# Patient Record
Sex: FEMALE | Race: WHITE | NOT HISPANIC OR LATINO | URBAN - METROPOLITAN AREA
[De-identification: names, ages, dates, MRNs, and addresses within clinical notes are randomized per-mention and may not be internally consistent; named-entity substitution may affect disease eponyms.]

---

## 2017-12-01 ENCOUNTER — OFFICE VISIT (OUTPATIENT)
Dept: ORTHOPEDIC SURGERY | Facility: CLINIC | Age: 72
End: 2017-12-01

## 2017-12-01 VITALS — WEIGHT: 181.2 LBS | BODY MASS INDEX: 33.34 KG/M2 | TEMPERATURE: 97.5 F | HEIGHT: 62 IN

## 2017-12-01 DIAGNOSIS — Z96.652 HX OF TOTAL KNEE ARTHROPLASTY, LEFT: Primary | ICD-10-CM

## 2017-12-01 PROCEDURE — 73562 X-RAY EXAM OF KNEE 3: CPT | Performed by: ORTHOPAEDIC SURGERY

## 2017-12-01 PROCEDURE — 99212 OFFICE O/P EST SF 10 MIN: CPT | Performed by: ORTHOPAEDIC SURGERY

## 2017-12-01 NOTE — PROGRESS NOTES
"Karey Tejada  : 1945 MRN: [id number] DATE: 2017      DIAGNOSIS: 4 year follow up left total knee      SUBJECTIVE:  Patient returns today for 4 year follow up of left total knee replacement. Patient reports doing well with no unusual complaints.  She says, \"my knee feels wonderful.\"    OBJECTIVE:   Exam:. The incision is well healed. No sign of infection. Range of motion is measured at 0 to 121 degrees. The calf is soft and nontender with a negative Homans sign. Strength is normal and the patient is ambulating appropriately.  Neurovascular examination is intact and stable distally.    DIAGNOSTIC STUDIES  Xrays: 3 views of the left knee (AP, lateral, and sunrise) were ordered and reviewed for evaluation of recent knee replacement. They demonstrate a well positioned, well aligned knee replacement without complicating factors noted. In comparison with previous films there has been no change.    ASSESSMENT: 4 years status post left knee replacement.    PLAN: 1) Continue with activity ad beena. and follow-up as needed.      Julio Short MD  2017        "

## 2024-11-19 ENCOUNTER — HOSPITAL ENCOUNTER (OUTPATIENT)
Facility: HOSPITAL | Age: 79
Setting detail: OBSERVATION
Discharge: HOME | End: 2024-11-20
Attending: EMERGENCY MEDICINE | Admitting: INTERNAL MEDICINE
Payer: MEDICARE

## 2024-11-19 DIAGNOSIS — N17.9 AKI (ACUTE KIDNEY INJURY): Primary | ICD-10-CM

## 2024-11-19 DIAGNOSIS — R55 NEAR SYNCOPE: ICD-10-CM

## 2024-11-19 PROBLEM — I10 HTN (HYPERTENSION): Status: ACTIVE | Noted: 2024-11-19

## 2024-11-19 PROBLEM — M81.0 OSTEOPOROSIS: Status: ACTIVE | Noted: 2024-11-19

## 2024-11-19 PROBLEM — E55.9 VITAMIN D DEFICIENCY: Status: ACTIVE | Noted: 2024-11-19

## 2024-11-19 PROBLEM — D47.2 MGUS (MONOCLONAL GAMMOPATHY OF UNKNOWN SIGNIFICANCE): Status: ACTIVE | Noted: 2024-11-19

## 2024-11-19 LAB
ALBUMIN SERPL-MCNC: 3.7 G/DL (ref 3.5–5.2)
ALBUMIN/GLOB SERPL: 1.2 G/DL
ALP SERPL-CCNC: 66 U/L (ref 39–117)
ALT SERPL W P-5'-P-CCNC: 16 U/L (ref 1–33)
ANION GAP SERPL CALCULATED.3IONS-SCNC: 14.7 MMOL/L (ref 5–15)
AST SERPL-CCNC: 19 U/L (ref 1–32)
BACTERIA UR QL AUTO: ABNORMAL /HPF
BASOPHILS # BLD AUTO: 0.03 10*3/MM3 (ref 0–0.2)
BASOPHILS NFR BLD AUTO: 0.2 % (ref 0–1.5)
BILIRUB SERPL-MCNC: 0.3 MG/DL (ref 0–1.2)
BILIRUB UR QL STRIP: NEGATIVE
BUN SERPL-MCNC: 92 MG/DL (ref 8–23)
BUN/CREAT SERPL: 34.8 (ref 7–25)
CALCIUM SPEC-SCNC: 9.4 MG/DL (ref 8.6–10.5)
CHLORIDE SERPL-SCNC: 100 MMOL/L (ref 98–107)
CK SERPL-CCNC: 51 U/L (ref 20–180)
CLARITY UR: CLEAR
CO2 SERPL-SCNC: 20.3 MMOL/L (ref 22–29)
COLOR UR: YELLOW
CREAT SERPL-MCNC: 2.64 MG/DL (ref 0.57–1)
CREAT UR-MCNC: 80.4 MG/DL
DEPRECATED RDW RBC AUTO: 41.1 FL (ref 37–54)
EGFRCR SERPLBLD CKD-EPI 2021: 17.9 ML/MIN/1.73
EOSINOPHIL # BLD AUTO: 0.03 10*3/MM3 (ref 0–0.4)
EOSINOPHIL NFR BLD AUTO: 0.2 % (ref 0.3–6.2)
ERYTHROCYTE [DISTWIDTH] IN BLOOD BY AUTOMATED COUNT: 12.6 % (ref 12.3–15.4)
GEN 5 2HR TROPONIN T REFLEX: 25 NG/L
GLOBULIN UR ELPH-MCNC: 3.2 GM/DL
GLUCOSE BLDC GLUCOMTR-MCNC: 130 MG/DL (ref 70–130)
GLUCOSE BLDC GLUCOMTR-MCNC: 96 MG/DL (ref 70–130)
GLUCOSE SERPL-MCNC: 191 MG/DL (ref 65–99)
GLUCOSE UR STRIP-MCNC: NEGATIVE MG/DL
HCT VFR BLD AUTO: 41.7 % (ref 34–46.6)
HGB BLD-MCNC: 13.8 G/DL (ref 12–15.9)
HGB UR QL STRIP.AUTO: NEGATIVE
HYALINE CASTS UR QL AUTO: ABNORMAL /LPF
IMM GRANULOCYTES # BLD AUTO: 0.09 10*3/MM3 (ref 0–0.05)
IMM GRANULOCYTES NFR BLD AUTO: 0.7 % (ref 0–0.5)
KETONES UR QL STRIP: NEGATIVE
LEUKOCYTE ESTERASE UR QL STRIP.AUTO: ABNORMAL
LYMPHOCYTES # BLD AUTO: 3.47 10*3/MM3 (ref 0.7–3.1)
LYMPHOCYTES NFR BLD AUTO: 26.1 % (ref 19.6–45.3)
MCH RBC QN AUTO: 29.8 PG (ref 26.6–33)
MCHC RBC AUTO-ENTMCNC: 33.1 G/DL (ref 31.5–35.7)
MCV RBC AUTO: 90.1 FL (ref 79–97)
MONOCYTES # BLD AUTO: 0.6 10*3/MM3 (ref 0.1–0.9)
MONOCYTES NFR BLD AUTO: 4.5 % (ref 5–12)
NEUTROPHILS NFR BLD AUTO: 68.3 % (ref 42.7–76)
NEUTROPHILS NFR BLD AUTO: 9.08 10*3/MM3 (ref 1.7–7)
NITRITE UR QL STRIP: NEGATIVE
NRBC BLD AUTO-RTO: 0 /100 WBC (ref 0–0.2)
PH UR STRIP.AUTO: <=5 [PH] (ref 5–8)
PLATELET # BLD AUTO: 295 10*3/MM3 (ref 140–450)
PMV BLD AUTO: 10 FL (ref 6–12)
POTASSIUM SERPL-SCNC: 3.7 MMOL/L (ref 3.5–5.2)
PROT SERPL-MCNC: 6.9 G/DL (ref 6–8.5)
PROT UR QL STRIP: NEGATIVE
QT INTERVAL: 355 MS
QTC INTERVAL: 465 MS
RBC # BLD AUTO: 4.63 10*6/MM3 (ref 3.77–5.28)
RBC # UR STRIP: ABNORMAL /HPF
REF LAB TEST METHOD: ABNORMAL
SODIUM SERPL-SCNC: 135 MMOL/L (ref 136–145)
SODIUM UR-SCNC: <20 MMOL/L
SP GR UR STRIP: 1.01 (ref 1–1.03)
SQUAMOUS #/AREA URNS HPF: ABNORMAL /HPF
TROPONIN T DELTA: -8 NG/L
TROPONIN T SERPL HS-MCNC: 33 NG/L
UROBILINOGEN UR QL STRIP: ABNORMAL
WBC # UR STRIP: ABNORMAL /HPF
WBC NRBC COR # BLD AUTO: 13.3 10*3/MM3 (ref 3.4–10.8)

## 2024-11-19 PROCEDURE — 93005 ELECTROCARDIOGRAM TRACING: CPT | Performed by: EMERGENCY MEDICINE

## 2024-11-19 PROCEDURE — 84484 ASSAY OF TROPONIN QUANT: CPT | Performed by: INTERNAL MEDICINE

## 2024-11-19 PROCEDURE — 25810000003 SODIUM CHLORIDE 0.9 % SOLUTION: Performed by: EMERGENCY MEDICINE

## 2024-11-19 PROCEDURE — 81001 URINALYSIS AUTO W/SCOPE: CPT | Performed by: INTERNAL MEDICINE

## 2024-11-19 PROCEDURE — 82570 ASSAY OF URINE CREATININE: CPT | Performed by: INTERNAL MEDICINE

## 2024-11-19 PROCEDURE — 36415 COLL VENOUS BLD VENIPUNCTURE: CPT | Performed by: INTERNAL MEDICINE

## 2024-11-19 PROCEDURE — 80053 COMPREHEN METABOLIC PANEL: CPT | Performed by: EMERGENCY MEDICINE

## 2024-11-19 PROCEDURE — 82550 ASSAY OF CK (CPK): CPT | Performed by: EMERGENCY MEDICINE

## 2024-11-19 PROCEDURE — 82948 REAGENT STRIP/BLOOD GLUCOSE: CPT

## 2024-11-19 PROCEDURE — 25810000003 SODIUM CHLORIDE 0.9 % SOLUTION: Performed by: INTERNAL MEDICINE

## 2024-11-19 PROCEDURE — 84484 ASSAY OF TROPONIN QUANT: CPT | Performed by: EMERGENCY MEDICINE

## 2024-11-19 PROCEDURE — 85025 COMPLETE CBC W/AUTO DIFF WBC: CPT | Performed by: EMERGENCY MEDICINE

## 2024-11-19 PROCEDURE — G0378 HOSPITAL OBSERVATION PER HR: HCPCS

## 2024-11-19 PROCEDURE — 99285 EMERGENCY DEPT VISIT HI MDM: CPT

## 2024-11-19 PROCEDURE — 84300 ASSAY OF URINE SODIUM: CPT | Performed by: INTERNAL MEDICINE

## 2024-11-19 PROCEDURE — 93010 ELECTROCARDIOGRAM REPORT: CPT | Performed by: INTERNAL MEDICINE

## 2024-11-19 RX ORDER — ACETAMINOPHEN 650 MG/1
650 SUPPOSITORY RECTAL EVERY 4 HOURS PRN
Status: DISCONTINUED | OUTPATIENT
Start: 2024-11-19 | End: 2024-11-20 | Stop reason: HOSPADM

## 2024-11-19 RX ORDER — SODIUM CHLORIDE 0.9 % (FLUSH) 0.9 %
10 SYRINGE (ML) INJECTION AS NEEDED
Status: DISCONTINUED | OUTPATIENT
Start: 2024-11-19 | End: 2024-11-20 | Stop reason: HOSPADM

## 2024-11-19 RX ORDER — NITROGLYCERIN 0.4 MG/1
0.4 TABLET SUBLINGUAL
Status: DISCONTINUED | OUTPATIENT
Start: 2024-11-19 | End: 2024-11-20 | Stop reason: HOSPADM

## 2024-11-19 RX ORDER — VALSARTAN 160 MG/1
160 TABLET ORAL
Status: DISCONTINUED | OUTPATIENT
Start: 2024-11-19 | End: 2024-11-20

## 2024-11-19 RX ORDER — CHOLECALCIFEROL (VITAMIN D3) 25 MCG
1000 TABLET ORAL DAILY
Status: DISCONTINUED | OUTPATIENT
Start: 2024-11-19 | End: 2024-11-20 | Stop reason: HOSPADM

## 2024-11-19 RX ORDER — ACETAMINOPHEN 160 MG/5ML
650 SOLUTION ORAL EVERY 4 HOURS PRN
Status: DISCONTINUED | OUTPATIENT
Start: 2024-11-19 | End: 2024-11-20 | Stop reason: HOSPADM

## 2024-11-19 RX ORDER — HYDROCHLOROTHIAZIDE 25 MG/1
25 TABLET ORAL
Status: DISCONTINUED | OUTPATIENT
Start: 2024-11-19 | End: 2024-11-20

## 2024-11-19 RX ORDER — SODIUM CHLORIDE 0.9 % (FLUSH) 0.9 %
10 SYRINGE (ML) INJECTION EVERY 12 HOURS SCHEDULED
Status: DISCONTINUED | OUTPATIENT
Start: 2024-11-19 | End: 2024-11-20 | Stop reason: HOSPADM

## 2024-11-19 RX ORDER — MULTIPLE VITAMINS W/ MINERALS TAB 9MG-400MCG
1 TAB ORAL DAILY
Status: DISCONTINUED | OUTPATIENT
Start: 2024-11-19 | End: 2024-11-20 | Stop reason: HOSPADM

## 2024-11-19 RX ORDER — ATENOLOL 25 MG/1
25 TABLET ORAL DAILY
Status: DISCONTINUED | OUTPATIENT
Start: 2024-11-19 | End: 2024-11-20

## 2024-11-19 RX ORDER — ACETAMINOPHEN 325 MG/1
650 TABLET ORAL EVERY 4 HOURS PRN
Status: DISCONTINUED | OUTPATIENT
Start: 2024-11-19 | End: 2024-11-20 | Stop reason: HOSPADM

## 2024-11-19 RX ORDER — SODIUM CHLORIDE 9 MG/ML
40 INJECTION, SOLUTION INTRAVENOUS AS NEEDED
Status: DISCONTINUED | OUTPATIENT
Start: 2024-11-19 | End: 2024-11-20 | Stop reason: HOSPADM

## 2024-11-19 RX ORDER — SODIUM CHLORIDE 9 MG/ML
125 INJECTION, SOLUTION INTRAVENOUS CONTINUOUS
Status: DISCONTINUED | OUTPATIENT
Start: 2024-11-19 | End: 2024-11-19

## 2024-11-19 RX ORDER — ONDANSETRON 2 MG/ML
4 INJECTION INTRAMUSCULAR; INTRAVENOUS EVERY 6 HOURS PRN
Status: DISCONTINUED | OUTPATIENT
Start: 2024-11-19 | End: 2024-11-20 | Stop reason: HOSPADM

## 2024-11-19 RX ORDER — SODIUM CHLORIDE 9 MG/ML
125 INJECTION, SOLUTION INTRAVENOUS CONTINUOUS
Status: ACTIVE | OUTPATIENT
Start: 2024-11-19 | End: 2024-11-19

## 2024-11-19 RX ADMIN — SODIUM CHLORIDE 1000 ML: 9 INJECTION, SOLUTION INTRAVENOUS at 14:19

## 2024-11-19 RX ADMIN — Medication 1 TABLET: at 17:52

## 2024-11-19 RX ADMIN — Medication 1000 UNITS: at 17:52

## 2024-11-19 RX ADMIN — Medication 10 ML: at 20:53

## 2024-11-19 RX ADMIN — SODIUM CHLORIDE 125 ML/HR: 9 INJECTION, SOLUTION INTRAVENOUS at 16:08

## 2024-11-19 NOTE — CONSULTS
Referring Provider: Dr. Pisano.  Reason for Consultation: Acute kidney injury on CKD 3B.    Subjective     Chief complaint   Chief Complaint   Patient presents with    Syncope       History of present illness: 78-year-old white female followed by Dr. Silvino Sharma in our group for CKD 3B baseline creatinine 1.4-1.6.  History of hypertension, monoclonal gammopathy unknown significance, vitamin D deficiency and osteoporosis.  On 11/ 7 she saw her primary care nurse practitioner for some right wrist pain and neck pain.  She was started on a prednisone Dosepak.  She was seen by her primary care provider on 11/15/2024 for complaints of nausea, vomiting, fatigue.  She  had some diarrhea earlier in the week but it had subsided.  Labs drawn that day demonstrated BUN of 77 and a creatinine 1.99.    Urinalysis performed in the office yesterday showed a cloudy urine with 25 leukocyte esterase.  Today after eating at [employer].  When she got up to leave, she felt very hot and nauseated like she was going to pass out.  Today in the emergency room her BUN and creatinine were 92 and 2.6.  ROS:Her appetite is improved. Urine output normal .  Bowels are now moving normally.  She has lost approximately 10 pounds over the past week.  No fever but felt hot today just when presyncopal.  Blood pressure has been low.  Left neck musculoskeletal pain.  Right wrist pain better after prednisone.  Past Medical History:   Diagnosis Date    Hypertension     Obesity      Past Surgical History:   Procedure Laterality Date    ABSCESS DRAINAGE      port colon    APPENDECTOMY      CHOLECYSTECTOMY      DILATATION AND CURETTAGE      HYSTERECTOMY       Family History   Problem Relation Age of Onset    Cancer Mother         breast cancer    Diabetes Other     Hypertension Other     Heart disease Other     Lung disease Other     Deep vein thrombosis Other      Wound.  Social History     Tobacco Use    Smoking status: Never    Smokeless tobacco:  Never   Substance Use Topics    Alcohol use: No    Drug use: No     (Not in a hospital admission)    Allergies:  Ace inhibitors, Gabapentin, Levaquin [levofloxacin], Metronidazole, and Tetracyclines & related    Review of Systems  14 points review of system was performed and it was negative other than what noted above in the HPI    Objective     Vital Signs  Temp:  [97 °F (36.1 °C)] 97 °F (36.1 °C)  Heart Rate:  [110] 110  Resp:  [18] 18  BP: (139)/(75) 139/75         No intake/output data recorded.  No intake/output data recorded.  No intake or output data in the 24 hours ending 11/19/24 1420    Physical Exam:  General Appearance: alert, oriented x 3, no acute distress, speech somewhat pressured.  Skin: warm and dry  HEENT: pupils round and reactive to light, oral mucosa normal, nonicteric sclera  Neck: supple, no JVD, trachea midline  Lungs: Clear to auscultation bilaterally.  Heart: RRR, normal S1 and S2, no S3, no rub  Abdomen: soft, nontender, normoactive bowel sounds.  : no palpable bladder,  Extremities: no edema.  Neuro: normal speech, somewhat pressured and tangential.    Results Review:  Results for orders placed or performed during the hospital encounter of 11/19/24   ECG 12 Lead Syncope    Collection Time: 11/19/24  1:17 PM   Result Value Ref Range    QT Interval 355 ms    QTC Interval 465 ms   Comprehensive Metabolic Panel    Collection Time: 11/19/24  1:30 PM    Specimen: Blood   Result Value Ref Range    Glucose 191 (H) 65 - 99 mg/dL    BUN 92 (H) 8 - 23 mg/dL    Creatinine 2.64 (H) 0.57 - 1.00 mg/dL    Sodium 135 (L) 136 - 145 mmol/L    Potassium 3.7 3.5 - 5.2 mmol/L    Chloride 100 98 - 107 mmol/L    CO2 20.3 (L) 22.0 - 29.0 mmol/L    Calcium 9.4 8.6 - 10.5 mg/dL    Total Protein 6.9 6.0 - 8.5 g/dL    Albumin 3.7 3.5 - 5.2 g/dL    ALT (SGPT) 16 1 - 33 U/L    AST (SGOT) 19 1 - 32 U/L    Alkaline Phosphatase 66 39 - 117 U/L    Total Bilirubin 0.3 0.0 - 1.2 mg/dL    Globulin 3.2 gm/dL    A/G Ratio  1.2 g/dL    BUN/Creatinine Ratio 34.8 (H) 7.0 - 25.0    Anion Gap 14.7 5.0 - 15.0 mmol/L    eGFR 17.9 (L) >60.0 mL/min/1.73   CBC Auto Differential    Collection Time: 11/19/24  1:30 PM    Specimen: Blood   Result Value Ref Range    WBC 13.30 (H) 3.40 - 10.80 10*3/mm3    RBC 4.63 3.77 - 5.28 10*6/mm3    Hemoglobin 13.8 12.0 - 15.9 g/dL    Hematocrit 41.7 34.0 - 46.6 %    MCV 90.1 79.0 - 97.0 fL    MCH 29.8 26.6 - 33.0 pg    MCHC 33.1 31.5 - 35.7 g/dL    RDW 12.6 12.3 - 15.4 %    RDW-SD 41.1 37.0 - 54.0 fl    MPV 10.0 6.0 - 12.0 fL    Platelets 295 140 - 450 10*3/mm3    Neutrophil % 68.3 42.7 - 76.0 %    Lymphocyte % 26.1 19.6 - 45.3 %    Monocyte % 4.5 (L) 5.0 - 12.0 %    Eosinophil % 0.2 (L) 0.3 - 6.2 %    Basophil % 0.2 0.0 - 1.5 %    Immature Grans % 0.7 (H) 0.0 - 0.5 %    Neutrophils, Absolute 9.08 (H) 1.70 - 7.00 10*3/mm3    Lymphocytes, Absolute 3.47 (H) 0.70 - 3.10 10*3/mm3    Monocytes, Absolute 0.60 0.10 - 0.90 10*3/mm3    Eosinophils, Absolute 0.03 0.00 - 0.40 10*3/mm3    Basophils, Absolute 0.03 0.00 - 0.20 10*3/mm3    Immature Grans, Absolute 0.09 (H) 0.00 - 0.05 10*3/mm3    nRBC 0.0 0.0 - 0.2 /100 WBC   High Sensitivity Troponin T    Collection Time: 11/19/24  1:30 PM    Specimen: Blood   Result Value Ref Range    HS Troponin T 33 (H) <14 ng/L     Imaging Results (Last 72 Hours)       ** No results found for the last 72 hours. **              sodium chloride, 1,000 mL, Intravenous, Once           Assessment & Plan   Acute kidney injury on CKD 3B with baseline creatinine 1.4 to 1.6. Underlying hypertensive nephrosclerosis.  Acute component likely due to   hypotension, hypovolemia, nausea with poor p.o. intake, diarrhea in the setting of impaired renal autoregulation with valsartan and hydrochlorothiazide.  Some contribution to azotemia from recent steroid.  2.  Hypotension and presyncope.  History of hypertension on atenolol, losartan and hydrochlorothiazide.  Stop antihypertensives.  IV fluid bolus.  3.   Monoclonal gammopathy of unknown significance.  4.  Postmenopausal osteoporosis.  On Prolia.  5.  Vitamin D deficiency.  On supplement.  6.  Hyperglycemia likely due to recent steroid use.  Plan:  Agree with IV fluid bolus and subsequent continuous IV fluid.  Hold valsartan/hydrochlorothiazide and atenolol for now.  Recheck chemistries in the morning.  Accu-Cheks in the next 24 hours.  5.  Check urine chemistries.  I discussed the patient's findings and my recommendations with patient and her friend at bedside.    Anne-Marie Hernandez MD  11/19/24  14:20 EST    Please note that portions of this note were completed with a voice recognition program.

## 2024-11-19 NOTE — ED NOTES
..Nursing report ED to floor  Karey Tejada  78 y.o.  female    HPI :  HPI  Stated Reason for Visit: pt reports near syncopal episode after eating, was standing up to pay. pt states she got dizzy and vomited as well. no complaints of dizziness currently  History Obtained From: patient    Chief Complaint  Chief Complaint   Patient presents with    Syncope       Admitting doctor:   Walter Vergara MD    Admitting diagnosis:   The primary encounter diagnosis was VICKEY (acute kidney injury). A diagnosis of Near syncope was also pertinent to this visit.    Code status:   Current Code Status       Date Active Code Status Order ID Comments User Context       Not on file            Allergies:   Ace inhibitors, Gabapentin, Levaquin [levofloxacin], Metronidazole, and Tetracyclines & related    Isolation:   No active isolations    Intake and Output  No intake or output data in the 24 hours ending 11/19/24 1454    Weight:   There were no vitals filed for this visit.    Most recent vitals:   Vitals:    11/19/24 1307 11/19/24 1309 11/19/24 1431   BP:  139/75 91/70   BP Location:  Right arm    Patient Position:  Sitting    Pulse: 110  85   Resp: 18     Temp: 97 °F (36.1 °C)     SpO2: 99%  93%       Active LDAs/IV Access:   Lines, Drains & Airways       Active LDAs       Name Placement date Placement time Site Days    Peripheral IV 11/19/24 1333 Anterior;Proximal;Right Forearm 11/19/24  1333  Forearm  less than 1                    Labs (abnormal labs have a star):   Labs Reviewed   COMPREHENSIVE METABOLIC PANEL - Abnormal; Notable for the following components:       Result Value    Glucose 191 (*)     BUN 92 (*)     Creatinine 2.64 (*)     Sodium 135 (*)     CO2 20.3 (*)     BUN/Creatinine Ratio 34.8 (*)     eGFR 17.9 (*)     All other components within normal limits    Narrative:     GFR Normal >60  Chronic Kidney Disease <60  Kidney Failure <15    The GFR formula is only valid for adults with stable renal function between ages 18 and  70.   CBC WITH AUTO DIFFERENTIAL - Abnormal; Notable for the following components:    WBC 13.30 (*)     Monocyte % 4.5 (*)     Eosinophil % 0.2 (*)     Immature Grans % 0.7 (*)     Neutrophils, Absolute 9.08 (*)     Lymphocytes, Absolute 3.47 (*)     Immature Grans, Absolute 0.09 (*)     All other components within normal limits   TROPONIN - Abnormal; Notable for the following components:    HS Troponin T 33 (*)     All other components within normal limits    Narrative:     High Sensitive Troponin T Reference Range:  <14.0 ng/L- Negative Female for AMI  <22.0 ng/L- Negative Male for AMI  >=14 - Abnormal Female indicating possible myocardial injury.  >=22 - Abnormal Male indicating possible myocardial injury.   Clinicians would have to utilize clinical acumen, EKG, Troponin, and serial changes to determine if it is an Acute Myocardial Infarction or myocardial injury due to an underlying chronic condition.        CK - Normal   HIGH SENSITIVITIY TROPONIN T 2HR   URINALYSIS W/ MICROSCOPIC IF INDICATED (NO CULTURE)   SODIUM, URINE, RANDOM   CREATININE URINE RANDOM (KIDNEY FUNCTION) GFR COMPONENT   CBC AND DIFFERENTIAL    Narrative:     The following orders were created for panel order CBC & Differential.  Procedure                               Abnormality         Status                     ---------                               -----------         ------                     CBC Auto Differential[[id number]]        Abnormal            Final result                 Please view results for these tests on the individual orders.       EKG:   ECG 12 Lead Syncope   Preliminary Result   HEART QPWZ=014  bpm   RR Vabevkbx=105  ms   OR Lvgdsrzo=341  ms   P Horizontal Axis=15  deg   P Front Axis=79  deg   QRSD Jbsshfcu=588  ms   QT Ilqokktb=920  ms   KBdS=883  ms   QRS Axis=-54  deg   T Wave Axis=44  deg   - ABNORMAL ECG -   Sinus tachycardia   RBBB and LAFB   Date and Time of Study:2024-11-19 13:17:53      Telemetry Scan   Final  Result          Meds given in ED:   Medications   sodium chloride 0.9 % flush 10 mL (has no administration in time range)   sodium chloride 0.9 % infusion (has no administration in time range)   sodium chloride 0.9 % bolus 1,000 mL (1,000 mL Intravenous New Bag 11/19/24 2971)       Imaging results:  No radiology results for the last day    Ambulatory status:   - up with stand by assist     Social issues:   Social History     Socioeconomic History    Marital status:    Tobacco Use    Smoking status: Never    Smokeless tobacco: Never   Substance and Sexual Activity    Alcohol use: No    Drug use: No    Sexual activity: Defer       Peripheral Neurovascular  Peripheral Neurovascular (Adult)  Peripheral Neurovascular WDL: WDL, pulse assessment  Pulse Assessment: radial    Neuro Cognitive  Neuro Cognitive (Adult)  Cognitive/Neuro/Behavioral WDL: WDL, arousability, level of consciousness, mood/behavior, orientation, speech, all  Level of Consciousness: Alert  Arousal Level: opens eyes spontaneously  Orientation: oriented x 4, person, place, time, situation  Speech: clear, spontaneous, logical  Mood/Behavior: calm, cooperative, behavior appropriate to situation  Additional Documentation: [place] Coma Scale (Group), Dizziness Assessment (Group)  Motor Response  Motor Response: general motor response  General Motor Response: purposeful motor response  Faisal Coma Scale  Best Eye Response: 4-->(E4) spontaneous  Best Motor Response: 6-->(M6) obeys commands  Best Verbal Response: 5-->(V5) oriented  Faisal Coma Scale Score: 15    Learning  Learning Assessment  Learning Readiness and Ability: no barriers identified  Education Provided  Person Taught: patient  Teaching Method: verbal instruction  Teaching Focus: self-management, diet modification, medication actions and side effects, medical device/equipment use    Respiratory  Respiratory  Airway WDL: WDL  Respiratory WDL  Respiratory WDL: WDL, all  Rhythm/Pattern,  Respiratory: pattern regular, no shortness of breath reported, unlabored, depth regular  Expansion/Accessory Muscles/Retractions: expansion symmetric, no retractions, no use of accessory muscles    Abdominal Pain  Safety Interventions  Safety Precautions/Falls Reduction: family at bedside    Pain Assessments  Pain (Adult)  (0-10) Pain Rating: Rest: 0    NIH Stroke Scale       Ainsley Jiang RN  11/19/24 14:54 EST

## 2024-11-19 NOTE — ED PROVIDER NOTES
EMERGENCY DEPARTMENT ENCOUNTER    Room Number:  10/10  PCP: Loly Juarez MD    HPI:  Chief Complaint: Near syncope  A complete HPI/ROS/PMH/PSH/SH/FH are unobtainable due to: None  Context: Karey Tejada is a 78 y.o. female who presents to the ED c/o acute near syncope.  Patient was at [employer] today when she stood up to go play.  She walked over the counter and then became lightheaded and nearly passed out.  She denies having any headache, vision change, chest pain, shortness of breath, palpitations.  She did not was escorted to the car where she then had an episode of emesis.  She denies have any abdominal pain.        PAST MEDICAL HISTORY  Active Ambulatory Problems     Diagnosis Date Noted    Plantar fasciitis, right 03/25/2016    Heel pain 03/25/2016    Hx of total knee arthroplasty, left 12/01/2017     Resolved Ambulatory Problems     Diagnosis Date Noted    No Resolved Ambulatory Problems     Past Medical History:   Diagnosis Date    Hypertension     Obesity          PAST SURGICAL HISTORY  Past Surgical History:   Procedure Laterality Date    ABSCESS DRAINAGE      port colon    APPENDECTOMY      CHOLECYSTECTOMY      DILATATION AND CURETTAGE      HYSTERECTOMY           FAMILY HISTORY  Family History   Problem Relation Age of Onset    Cancer Mother         breast cancer    Diabetes Other     Hypertension Other     Heart disease Other     Lung disease Other     Deep vein thrombosis Other          SOCIAL HISTORY  Social History     Socioeconomic History    Marital status:    Tobacco Use    Smoking status: Never    Smokeless tobacco: Never   Substance and Sexual Activity    Alcohol use: No    Drug use: No    Sexual activity: Defer         ALLERGIES  Ace inhibitors, Gabapentin, Levaquin [levofloxacin], Metronidazole, and Tetracyclines & related        REVIEW OF SYSTEMS  Review of Systems     Included in HPI  All systems reviewed and negative except for those discussed in HPI.        PHYSICAL EXAM  ED Triage Vitals   Temp Heart Rate Resp BP SpO2   11/19/24 1307 11/19/24 1307 11/19/24 1307 11/19/24 1309 11/19/24 1307   97 °F (36.1 °C) 110 18 139/75 99 %      Temp src Heart Rate Source Patient Position BP Location FiO2 (%)   -- -- 11/19/24 1309 11/19/24 1309 --     Sitting Right arm        Physical Exam      GENERAL: no acute distress  HENT: nares patent  EYES: no scleral icterus  CV: regular rhythm, normal rate  RESPIRATORY: normal effort, clear to auscultation bilaterally  ABDOMEN: soft, nontender  MUSCULOSKELETAL: no deformity  NEURO: alert, moves all extremities, follows commands  PSYCH:  calm, cooperative  SKIN: warm, dry    Vital signs and nursing notes reviewed.          LAB RESULTS  Recent Results (from the past 24 hours)   POCT URINALYSIS DIPSTICK, AUTOMATED    Collection Time: 11/18/24  2:46 PM    Specimen: Urine    Specimen type and source: Urine, Urine specimen (specimen)   Result Value Ref Range    Color, UA Yellow     Appearance, Fluid Cloudy (A) Clear, Slightly Cloudy    Specific Gravity, UA 1.020 1.000 - 1.030    pH, UA 5 5 - 8    Leukocytes, UA 25 Aggie/ul (A) Negative    Nitrite, UA Negative Negative    Total Protein, urine Negative Negative, Trace    Glucose, UA Normal Normal    External Ketones, Urine Negative Negative    Urobilinogen, UA Normal Normal mg/dL    External Bilirubin, Urine Negative Negative    Blood, UA Negative Negative    QC Acceptable Acceptable    Lot Number [id number]     Expiration Date 7,312,025     Comment     ECG 12 Lead Syncope    Collection Time: 11/19/24  1:17 PM   Result Value Ref Range    QT Interval 355 ms    QTC Interval 465 ms   Comprehensive Metabolic Panel    Collection Time: 11/19/24  1:30 PM    Specimen: Blood   Result Value Ref Range    Glucose 191 (H) 65 - 99 mg/dL    BUN 92 (H) 8 - 23 mg/dL    Creatinine 2.64 (H) 0.57 - 1.00 mg/dL    Sodium 135 (L) 136 - 145 mmol/L    Potassium 3.7 3.5 - 5.2 mmol/L    Chloride 100 98 - 107 mmol/L    CO2  20.3 (L) 22.0 - 29.0 mmol/L    Calcium 9.4 8.6 - 10.5 mg/dL    Total Protein 6.9 6.0 - 8.5 g/dL    Albumin 3.7 3.5 - 5.2 g/dL    ALT (SGPT) 16 1 - 33 U/L    AST (SGOT) 19 1 - 32 U/L    Alkaline Phosphatase 66 39 - 117 U/L    Total Bilirubin 0.3 0.0 - 1.2 mg/dL    Globulin 3.2 gm/dL    A/G Ratio 1.2 g/dL    BUN/Creatinine Ratio 34.8 (H) 7.0 - 25.0    Anion Gap 14.7 5.0 - 15.0 mmol/L    eGFR 17.9 (L) >60.0 mL/min/1.73   CBC Auto Differential    Collection Time: 11/19/24  1:30 PM    Specimen: Blood   Result Value Ref Range    WBC 13.30 (H) 3.40 - 10.80 10*3/mm3    RBC 4.63 3.77 - 5.28 10*6/mm3    Hemoglobin 13.8 12.0 - 15.9 g/dL    Hematocrit 41.7 34.0 - 46.6 %    MCV 90.1 79.0 - 97.0 fL    MCH 29.8 26.6 - 33.0 pg    MCHC 33.1 31.5 - 35.7 g/dL    RDW 12.6 12.3 - 15.4 %    RDW-SD 41.1 37.0 - 54.0 fl    MPV 10.0 6.0 - 12.0 fL    Platelets 295 140 - 450 10*3/mm3    Neutrophil % 68.3 42.7 - 76.0 %    Lymphocyte % 26.1 19.6 - 45.3 %    Monocyte % 4.5 (L) 5.0 - 12.0 %    Eosinophil % 0.2 (L) 0.3 - 6.2 %    Basophil % 0.2 0.0 - 1.5 %    Immature Grans % 0.7 (H) 0.0 - 0.5 %    Neutrophils, Absolute 9.08 (H) 1.70 - 7.00 10*3/mm3    Lymphocytes, Absolute 3.47 (H) 0.70 - 3.10 10*3/mm3    Monocytes, Absolute 0.60 0.10 - 0.90 10*3/mm3    Eosinophils, Absolute 0.03 0.00 - 0.40 10*3/mm3    Basophils, Absolute 0.03 0.00 - 0.20 10*3/mm3    Immature Grans, Absolute 0.09 (H) 0.00 - 0.05 10*3/mm3    nRBC 0.0 0.0 - 0.2 /100 WBC   High Sensitivity Troponin T    Collection Time: 11/19/24  1:30 PM    Specimen: Blood   Result Value Ref Range    HS Troponin T 33 (H) <14 ng/L       Ordered the above labs and reviewed the results.        RADIOLOGY  No Radiology Exams Resulted Within Past 24 Hours    Ordered the above noted radiological studies. Reviewed by me in PACS.        MEDICATIONS GIVEN IN ER  Medications   sodium chloride 0.9 % flush 10 mL (has no administration in time range)   sodium chloride 0.9 % bolus 1,000 mL (has no administration  in time range)         ORDERS PLACED DURING THIS VISIT:  Orders Placed This Encounter   Procedures    Comprehensive Metabolic Panel    CBC Auto Differential    High Sensitivity Troponin T    High Sensitivity Troponin T 2Hr    CK    Urinalysis With Microscopic If Indicated (No Culture) - Urine, Clean Catch    Monitor Blood Pressure    Continuous Pulse Oximetry    [facility] (on-call MD unless specified) Details    Nephrology (on -call MD unless specified)    ECG 12 Lead Syncope    Telemetry Scan    Insert Peripheral IV    CBC & Differential         OUTPATIENT MEDICATION MANAGEMENT:  Current Facility-Administered Medications Ordered in Epic   Medication Dose Route Frequency Provider Last Rate Last Admin    sodium chloride 0.9 % bolus 1,000 mL  1,000 mL Intravenous Once Alexi Pisano II, MD        sodium chloride 0.9 % flush 10 mL  10 mL Intravenous PRN Alexi Pisano II, MD         Current Outpatient Medications Ordered in Epic   Medication Sig Dispense Refill    atenolol (TENORMIN) 25 MG tablet Take 25 mg by mouth daily.      BIOTIN PO Take  by mouth.      Chlorpheniramine Maleate (CHLORPHEN MALEATE PO) Take 4 mg by mouth daily.      cholecalciferol (VITAMIN D3) 1000 UNITS tablet Take 1,000 Units by mouth daily.      Multiple Vitamins-Minerals (WOMENS 50+ MULTI VITAMIN/MIN PO) Take  by mouth daily.      valsartan-hydrochlorothiazide (DIOVAN-HCT) 160-25 MG per tablet Take 1 tablet by mouth daily.         PROCEDURES  Procedures          MEDICAL DECISION MAKING, PROGRESS, and CONSULTS    Discussion below represents my analysis of pertinent findings related to patient's condition, differential diagnosis, treatment plan and final disposition.      Additional sources:  - Discussed/obtained information from independent historians: Friend at bedside  Additional information was obtained to confirm the patient's history.    - External (non-ED) record review: On medical chart review, reviewed outside records from [facility].  She had a CMP performed on 11/15/2024 which was 4 days ago which showed creatinine of 1.99.  Today's creatinine is 2.6.      Differential diagnosis:    Cardiac arrhythmia, orthostatic near syncope, volume depletion.  I have low suspicion for ACS or PE.                 Independent interpretation of labs, radiology studies, and discussions with consultants:  ED Course as of 11/23/24 1706   e Nov 19, 2024   1409 EKG independently interpreted by myself.  Time 1:17 PM.  Sinus rhythm.  Heart rate 103.  Right bundle branch block and LAFB.  No acute ST abnormality. [TD]   1433 I discussed the case with Dr. Hernandez, nephrology.  She will follow along in consultation. [TD]   1436 I discussed the case with Dr. Vergara, hospitalist.  He will admit. [TD]      ED Course User Index  [TD] Alexi Pisano II, MD           DIAGNOSIS  Final diagnoses:   VICKEY (acute kidney injury)   Near syncope         DISPOSITION  Admit      Latest Documented Vital Signs:  As of 14:12 EST  BP- 139/75 HR- 110 Temp- 97 °F (36.1 °C) O2 sat- 99%      --    Please note that portions of this were completed with a voice recognition program.       Note Disclaimer: At [facility], we believe that sharing information builds trust and better relationships. You are receiving this note because you are receiving care at [facility] or recently visited. It is possible you will see health information before a provider has talked with you about it. This kind of information can be easy to misunderstand. To help you fully understand what it means for your health, we urge you to discuss this note with your provider.         Alexi Pisano II, MD  11/23/24 1706

## 2024-11-19 NOTE — PLAN OF CARE
Goal Outcome Evaluation:  Plan of Care Reviewed With: patient           Outcome Evaluation: PT ARRIVED TO UNIT AROUND 1530, RA, ST, ALERT AND ORIENTED X4, STAND BY ASSIST, WILL CONT TO MONITOR                             Problem: Adult Inpatient Plan of Care  Goal: Plan of Care Review  Outcome: Progressing  Flowsheets (Taken 11/19/2024 1545)  Outcome Evaluation: PT ARRIVED TO UNIT AROUND 1530, RA, ST, ALERT AND ORIENTED X4, STAND BY ASSIST, WILL CONT TO MONITOR  Plan of Care Reviewed With: patient  Goal: Patient-Specific Goal (Individualized)  Outcome: Progressing  Goal: Absence of Hospital-Acquired Illness or Injury  Outcome: Progressing  Goal: Optimal Comfort and Wellbeing  Outcome: Progressing  Goal: Readiness for Transition of Care  Outcome: Progressing  Intervention: Mutually Develop Transition Plan  Recent Flowsheet Documentation  Taken 11/19/2024 1535 by Pia Beck, RN  Transportation Anticipated: family or friend will provide  Patient/Family Anticipated Services at Transition: none  Patient/Family Anticipates Transition to: home  Taken 11/19/2024 1533 by Pia Beck, RN  Equipment Currently Used at Home: none

## 2024-11-19 NOTE — H&P
Internal medicine history and physical  INTERNAL MEDICINE   [facility]       Patient Identification:  Name: Karey Tejada  Age: 78 y.o.  Sex: female  :  1945  MRN: [id number]                   Primary Care Physician: Loly Juarez MD                               Date of admission:2024    Chief Complaint: Felt dizzy and almost passed out as she was going to pain in her lunch check at [employer] earlier today.    History of Present Illness:   Patient is a 78-year-old female with history of hypertension, osteoporosis and IgG lambda monoclonal gammopathy of unknown significance has been dealing with episodes of dizziness upon standing up and walking around for the last 2-3 years and usually stops and rests when she developed the symptoms before she completely passed out and then subsequently carries on with her activities.  This background she had some issues with her wrist and neck hurting and was seen by her primary care provider on 2024.  She recalls these symptoms started after she was moving heavy objects around.  She was assessed to have osteoarthritis and flare due to physical activity and was prescribed Medrol Dosepak at the referral to neurology service.  Patient was subsequently seen again a week later on 11/15/2024 with 2 to 3 days history of nausea and feeling weak with symptoms occurring only when she is up and moving and gets better when she rests.  She had diarrhea in the beginning of the week that resolved.  She did admit to have low-grade fever and chills.  Further evaluation of nausea and fatigue in the background of transient diarrhea and symptoms associated with posture and getting better at rest workup was initiated including urinalysis, CBC and CMP and TSH and she was prescribed oral Zofran.  Her urinalysis was unremarkable and on 2024 she was noted to have elevated white blood cell count and noted to have elevated creatinine.  Patient  was sent a message to have these lab work checked in a week.  In this background patient was feeling otherwise fine and went out to eat lunch at [employer] with friends and family members and had her meal without any problem.  After done eating she got up to play for her lunch and in doing so she developed symptoms of nausea and dizziness and felt very weak and almost feel like she can a pass out.  She sat down and started feeling better.  Because of the symptoms she was brought to the emergency room and was found to have white blood cell count of 13.3 BUN of 92 creatinine of 2.64 and a blood sugar of 191.  Nephrology service was consulted and assessment of prerenal azotemia in the setting of recent nausea and diarrhea with compounding effect of valsartan/hydrochlorothiazide was thought to be at play.  Valsartan and hydrochlorothiazide has been requested to be held and patient has been started on IV fluids.  Patient is already feeling much better after receiving fluid bolus in the emergency room.  In the emergency room patient was noted to be orthostatic without any significant rise in her heart rate with blood pressure dropping from 139/75 to 91/70.  Patient has osteoporosis and gets shot for her osteoporosis by her endocrinologist.  After arriving to the medical floor patient was able to go to the bathroom and did not feel as dizzy and nauseated as she has been usually feeling for the last 2 to 3 years.  Past Medical History:  Past Medical History:   Diagnosis Date    Hypertension     Obesity      Past Surgical History:  Past Surgical History:   Procedure Laterality Date    ABSCESS DRAINAGE      port colon    APPENDECTOMY      CHOLECYSTECTOMY      DILATATION AND CURETTAGE      HYSTERECTOMY        Home Meds:  Medications Prior to Admission   Medication Sig Dispense Refill Last Dose/Taking    atenolol (TENORMIN) 25 MG tablet Take 25 mg by mouth daily.       BIOTIN PO Take  by mouth.       Chlorpheniramine  "Maleate (CHLORPHEN MALEATE PO) Take 4 mg by mouth daily.       cholecalciferol (VITAMIN D3) 1000 UNITS tablet Take 1,000 Units by mouth daily.       Multiple Vitamins-Minerals (WOMENS 50+ MULTI VITAMIN/MIN PO) Take  by mouth daily.       valsartan-hydrochlorothiazide (DIOVAN-HCT) 160-25 MG per tablet Take 1 tablet by mouth daily.        Current Meds:     Current Facility-Administered Medications:     [COMPLETED] Insert Peripheral IV, , , Once **AND** sodium chloride 0.9 % flush 10 mL, 10 mL, Intravenous, PRN, Alexi Pisano II, MD    sodium chloride 0.9 % infusion, 125 mL/hr, Intravenous, Continuous, Anne-Marie Hernandez MD, Last Rate: 125 mL/hr at 11/19/24 1608, 125 mL/hr at 11/19/24 1608  Allergies:  Allergies   Allergen Reactions    Ace Inhibitors Other (See Comments)     cough    Gabapentin     Levaquin [Levofloxacin]     Metronidazole     Tetracyclines & Related      Social History:   Social History     Tobacco Use    Smoking status: Never    Smokeless tobacco: Never   Substance Use Topics    Alcohol use: No      Family History:  Family History   Problem Relation Age of Onset    Cancer Mother         breast cancer    Diabetes Other     Hypertension Other     Heart disease Other     Lung disease Other     Deep vein thrombosis Other           Review of Systems  See history of present illness and past medical history.  As described in history presenting illness.      Vitals:   /61 (BP Location: Left arm, Patient Position: Sitting)   Pulse 91   Temp 97.9 °F (36.6 °C) (Oral)   Resp 18   Ht 157.5 cm (62\")   Wt 78.6 kg (173 lb 4.5 oz)   SpO2 98%   BMI 31.69 kg/m²   I/O: No intake or output data in the 24 hours ending 11/19/24 1738  Exam:  Patient is examined using the personal protective equipment as per guidelines from infection control for this particular patient as enacted.  Hand washing was performed before and after patient interaction.  General Appearance:    Alert, cooperative, no distress, " appears stated age   Head:    Normocephalic, without obvious abnormality, atraumatic   Eyes:    PERRL, conjunctiva/corneas clear, EOM's intact, both eyes   Ears:    Normal external ear canals, both ears   Nose:   Nares normal, septum midline, mucosa normal, no drainage    or sinus tenderness   Throat:   Lips, tongue, gums normal; oral mucosa pink and moist   Neck:   Supple, symmetrical, trachea midline, no adenopathy;     thyroid:  no enlargement/tenderness/nodules; no carotid    bruit or JVD   Back:     Symmetric, no curvature, ROM normal, no CVA tenderness   Lungs:     Clear to auscultation bilaterally, respirations unlabored   Chest Wall:    No tenderness or deformity    Heart:    Regular rate and rhythm, S1 and S2 normal, no murmur, rub   or gallop   Abdomen:     Soft, non-tender, bowel sounds active all four quadrants,     no masses, no hepatomegaly, no splenomegaly   Extremities:   Extremities normal, atraumatic, no cyanosis or edema   Pulses:   Pulses palpable in all extremities; symmetric all extremities   Skin:   Skin color normal, Skin is warm and dry,  no rashes or palpable lesions   Neurologic:   CNII-XII intact, motor strength grossly intact, sensation grossly intact to light touch, no focal deficits noted       Data Review:      I reviewed the patient's new clinical results.  Results from last 7 days   Lab Units 11/19/24  1330   WBC 10*3/mm3 13.30*   HEMOGLOBIN g/dL 13.8   PLATELETS 10*3/mm3 295     Results from last 7 days   Lab Units 11/19/24  1330   SODIUM mmol/L 135*   POTASSIUM mmol/L 3.7   CHLORIDE mmol/L 100   CO2 mmol/L 20.3*   BUN mg/dL 92*   CREATININE mg/dL 2.64*   CALCIUM mg/dL 9.4   GLUCOSE mg/dL 191*     No radiology results for the last 30 days.  Brief Urine Lab Results  (Last result in the past 365 days)        Color   Clarity   Blood   Leuk Est   Nitrite   Protein   CREAT   Urine HCG        11/19/24 2029             80.4         11/19/24 2029 Yellow   Clear   Negative   Trace    Negative   Negative                 ECG 12 Lead Syncope   Final Result   HEART RRSF=644  bpm   RR Lowtsuaz=077  ms   MN Mlnpqckv=791  ms   P Horizontal Axis=15  deg   P Front Axis=79  deg   QRSD Msnalnmo=954  ms   QT Uvckxsqf=631  ms   WGuT=056  ms   QRS Axis=-54  deg   T Wave Axis=44  deg   - ABNORMAL ECG -   Sinus tachycardia   RBBB and LAFB   New lafb   Electronically Signed By: Anton GouldGABRIELA) ([facility]) 2024-11-19 19:49:11   Date and Time of Study:2024-11-19 13:17:53      Telemetry Scan   Final Result          Assessment:  Active Hospital Problems    Diagnosis  POA    **VICKEY (acute kidney injury) [N17.9]  Yes    Near syncope [R55]  Unknown    HTN (hypertension) [I10]  Unknown    MGUS (monoclonal gammopathy of unknown significance) [D47.2]  Unknown    Vitamin D deficiency [E55.9]  Unknown    Osteoporosis [M81.0]  Unknown       Medical decision making/care plan: See admission orders  Near syncope likely orthostatic drop in blood pressure with element of autonomic neuropathy given the fact that heart rate did not increase with the drop in blood pressure compounded by recent diarrhea decreased intake and postural nausea and ongoing use of valsartan/hydrochlorothiazide-plan is to hydrate hold her valsartan/hydrochlorothiazide and Tenormin with providing her with IV fluids and check orthostatics.  Provided with telemetry monitoring and reassess her symptoms after fluids and follow-up with nephrology advised about reintroduction of antihypertensives.  Her lack of rise in heart rate with postural drop in blood pressure is concerning.  Acute kidney injury on chronic kidney disease in the setting of MGUS-likely prerenal azotemia along with effect of valsartan/hydrochlorothiazide-plan is to hold these medication, continue with IV fluids and monitor.  May benefit from ultrasound of the kidneys to rule out obstruction.  Will defer this to our nephrology colleagues.  Patient has been seen by nephrology as per request from ER  physician prior to being admitted.  See their orders.  Hypertension-patient does have element of supine hypertension with orthostatic drop in blood pressure as discussed above-plan is to hold her antihypertensives until her blood pressure is stable and restarted Tenormin but hold valsartan/hydrochlorothiazide.  Osteoporosis-patient is being treated with yearly injection at endocrinology office.  Leukocytosis and hyperglycemia-likely due to recent use of Medrol Dosepak started on 11/15/2024 for wrist pain and neck pain-patient currently feels better in those areas and does not have symptoms of sepsis plan is to monitor her white blood cell count and blood sugar pattern.  Check hemoglobin A1c.    Walter Vergara MD   11/19/2024  17:38 EST    Parts of this note may be an electronic transcription/translation of spoken language to printed text using the Dragon dictation system.

## 2024-11-20 VITALS
TEMPERATURE: 97.9 F | HEART RATE: 70 BPM | RESPIRATION RATE: 18 BRPM | HEIGHT: 62 IN | DIASTOLIC BLOOD PRESSURE: 55 MMHG | OXYGEN SATURATION: 100 % | BODY MASS INDEX: 31.89 KG/M2 | WEIGHT: 173.28 LBS | SYSTOLIC BLOOD PRESSURE: 147 MMHG

## 2024-11-20 PROBLEM — N17.9 AKI (ACUTE KIDNEY INJURY): Status: RESOLVED | Noted: 2024-11-19 | Resolved: 2024-11-20

## 2024-11-20 LAB
ALBUMIN SERPL-MCNC: 3.2 G/DL (ref 3.5–5.2)
ANION GAP SERPL CALCULATED.3IONS-SCNC: 10.4 MMOL/L (ref 5–15)
BUN SERPL-MCNC: 69 MG/DL (ref 8–23)
BUN/CREAT SERPL: 49.6 (ref 7–25)
CALCIUM SPEC-SCNC: 8.5 MG/DL (ref 8.6–10.5)
CHLORIDE SERPL-SCNC: 113 MMOL/L (ref 98–107)
CO2 SERPL-SCNC: 19.6 MMOL/L (ref 22–29)
CREAT SERPL-MCNC: 1.39 MG/DL (ref 0.57–1)
EGFRCR SERPLBLD CKD-EPI 2021: 38.7 ML/MIN/1.73
GLUCOSE BLDC GLUCOMTR-MCNC: 91 MG/DL (ref 70–130)
GLUCOSE BLDC GLUCOMTR-MCNC: 94 MG/DL (ref 70–130)
GLUCOSE SERPL-MCNC: 94 MG/DL (ref 65–99)
HBA1C MFR BLD: 5.2 % (ref 4.8–5.6)
PHOSPHATE SERPL-MCNC: 2.5 MG/DL (ref 2.5–4.5)
POTASSIUM SERPL-SCNC: 3.8 MMOL/L (ref 3.5–5.2)
SODIUM SERPL-SCNC: 143 MMOL/L (ref 136–145)

## 2024-11-20 PROCEDURE — 96360 HYDRATION IV INFUSION INIT: CPT

## 2024-11-20 PROCEDURE — 83036 HEMOGLOBIN GLYCOSYLATED A1C: CPT | Performed by: INTERNAL MEDICINE

## 2024-11-20 PROCEDURE — G0378 HOSPITAL OBSERVATION PER HR: HCPCS

## 2024-11-20 PROCEDURE — 80069 RENAL FUNCTION PANEL: CPT | Performed by: INTERNAL MEDICINE

## 2024-11-20 PROCEDURE — 82948 REAGENT STRIP/BLOOD GLUCOSE: CPT

## 2024-11-20 RX ORDER — SODIUM CHLORIDE 9 MG/ML
100 INJECTION, SOLUTION INTRAVENOUS CONTINUOUS
Status: DISCONTINUED | OUTPATIENT
Start: 2024-11-20 | End: 2024-11-20

## 2024-11-20 RX ORDER — ATENOLOL 25 MG/1
25 TABLET ORAL
Status: DISCONTINUED | OUTPATIENT
Start: 2024-11-20 | End: 2024-11-20 | Stop reason: HOSPADM

## 2024-11-20 RX ADMIN — ATENOLOL 25 MG: 25 TABLET ORAL at 10:21

## 2024-11-20 RX ADMIN — Medication 1 TABLET: at 08:28

## 2024-11-20 RX ADMIN — Medication 1000 UNITS: at 08:28

## 2024-11-20 NOTE — PLAN OF CARE
Goal Outcome Evaluation:  Plan of Care Reviewed With: patient        Progress: improving  Outcome Evaluation: VSS overnight. Pt A&O x4. IVF continued. Pt ambulating with assist x1. Awaiting lab results. Will continue to monitor and await further orders.

## 2024-11-20 NOTE — DISCHARGE SUMMARY
"    Patient Name: Karey Tejada  : 1945  MRN: [id number]    Date of Admission: 2024  Date of Discharge:  2024  Primary Care Physician: Loly Juarez MD      Chief Complaint:   Syncope      Discharge Diagnoses     Active Hospital Problems    Diagnosis  POA    Near syncope [R55]  Unknown    HTN (hypertension) [I10]  Unknown    MGUS (monoclonal gammopathy of unknown significance) [D47.2]  Unknown    Vitamin D deficiency [E55.9]  Unknown    Osteoporosis [M81.0]  Unknown      Resolved Hospital Problems    Diagnosis Date Resolved POA    **VICKEY (acute kidney injury) [N17.9] 2024 Yes        Admitting HPI     \"Patient is a 78-year-old female with history of hypertension, osteoporosis and IgG lambda monoclonal gammopathy of unknown significance has been dealing with episodes of dizziness upon standing up and walking around for the last 2-3 years and usually stops and rests when she developed the symptoms before she completely passed out and then subsequently carries on with her activities.  This background she had some issues with her wrist and neck hurting and was seen by her primary care provider on 2024.  She recalls these symptoms started after she was moving heavy objects around.  She was assessed to have osteoarthritis and flare due to physical activity and was prescribed Medrol Dosepak at the referral to neurology service.  Patient was subsequently seen again a week later on 11/15/2024 with 2 to 3 days history of nausea and feeling weak with symptoms occurring only when she is up and moving and gets better when she rests.  She had diarrhea in the beginning of the week that resolved.  She did admit to have low-grade fever and chills.  Further evaluation of nausea and fatigue in the background of transient diarrhea and symptoms associated with posture and getting better at rest workup was initiated including urinalysis, CBC and CMP and TSH and she was prescribed oral Zofran.  " "Her urinalysis was unremarkable and on 11/18/2024 she was noted to have elevated white blood cell count and noted to have elevated creatinine.  Patient was sent a message to have these lab work checked in a week.  In this background patient was feeling otherwise fine and went out to eat lunch at [employer] with friends and family members and had her meal without any problem.  After done eating she got up to play for her lunch and in doing so she developed symptoms of nausea and dizziness and felt very weak and almost feel like she can a pass out.  She sat down and started feeling better.  Because of the symptoms she was brought to the emergency room and was found to have white blood cell count of 13.3 BUN of 92 creatinine of 2.64 and a blood sugar of 191.  Nephrology service was consulted and assessment of prerenal azotemia in the setting of recent nausea and diarrhea with compounding effect of valsartan/hydrochlorothiazide was thought to be at play.  Valsartan and hydrochlorothiazide has been requested to be held and patient has been started on IV fluids.  Patient is already feeling much better after receiving fluid bolus in the emergency room.  In the emergency room patient was noted to be orthostatic without any significant rise in her heart rate with blood pressure dropping from 139/75 to 91/70.  Patient has osteoporosis and gets shot for her osteoporosis by her endocrinologist.  After arriving to the medical floor patient was able to go to the bathroom and did not feel as dizzy and nauseated as she has been usually feeling for the last 2 to 3 years.\"    Hospital Course     Pt admitted for dizziness and VICKEY, seen in consultation with nephrology.  VICKEY suspected due to hypotension, hypokalemia, nausea and poor p.o. intake.  Suspect an element of orthostasis as well.  She received IV fluids with vast improvement in her symptoms.  Plan to discharge with compression stockings.  Would not want to add any midodrine " or Florinef at this time as we are planning on holding valsartan and HCTZ on discharge.  She also has supine hypertension and these medicines would worsen this.  She is symptomatically improved.  Stable for discharge, will follow-up with nephrology on 12/6/2024.    Day of Discharge     Physical Exam:  Temp:  [97.3 °F (36.3 °C)-97.9 °F (36.6 °C)] 97.9 °F (36.6 °C)  Heart Rate:  [64-91] 70  Resp:  [16-18] 18  BP: ()/(55-70) 147/55  Body mass index is 31.69 kg/m².  Physical Exam  Constitutional:       General: She is not in acute distress.  Pulmonary:      Effort: Pulmonary effort is normal. No respiratory distress.      Breath sounds: No stridor.   Skin:     Coloration: Skin is not jaundiced.      Findings: No bruising.   Neurological:      General: No focal deficit present.      Mental Status: She is alert and oriented to person, place, and time.   Psychiatric:         Mood and Affect: Mood normal.         Behavior: Behavior normal.         Thought Content: Thought content normal.         Consultants     Consult Orders (all) (From admission, onward)       Start     Ordered    11/19/24 1413  [facility] (on-call MD unless specified) Details  Once        Specialty:  Hospitalist  Provider:  Walter Vergara MD    11/19/24 1412    11/19/24 1413  Nephrology (on -call MD unless specified)  Once        Specialty:  Nephrology  Provider:  Anne-Marie Hernandez MD    11/19/24 1412                  Procedures     * Surgery not found *      Imaging Results (All)       None              Pertinent Labs     Results from last 7 days   Lab Units 11/19/24  1330   WBC 10*3/mm3 13.30*   HEMOGLOBIN g/dL 13.8   PLATELETS 10*3/mm3 295     Results from last 7 days   Lab Units 11/20/24  0343 11/19/24  1330   SODIUM mmol/L 143 135*   POTASSIUM mmol/L 3.8 3.7   CHLORIDE mmol/L 113* 100   CO2 mmol/L 19.6* 20.3*   BUN mg/dL 69* 92*   CREATININE mg/dL 1.39* 2.64*   GLUCOSE mg/dL 94 191*   EGFR mL/min/1.73 38.7* 17.9*     Results from last 7 days   Lab  "Units 11/20/24  0343 11/19/24  1330   ALBUMIN g/dL 3.2* 3.7   BILIRUBIN mg/dL  --  0.3   ALK PHOS U/L  --  66   AST (SGOT) U/L  --  19   ALT (SGPT) U/L  --  16     Results from last 7 days   Lab Units 11/20/24  0343 11/19/24  1330   CALCIUM mg/dL 8.5* 9.4   ALBUMIN g/dL 3.2* 3.7   PHOSPHORUS mg/dL 2.5  --        Results from last 7 days   Lab Units 11/19/24  1839 11/19/24  1330   CK TOTAL U/L  --  51   HSTROP T ng/L 25* 33*     Results from last 7 days   Lab Units 11/19/24 2029 11/18/24  1446   SODIUM UR mmol/L <20  --    CREATININE UR mg/dL 80.4  --    PROTEIN TOTAL URINE   --  Negative         Invalid input(s): \"LDLCALC\"          Test Results Pending at Discharge       Discharge Details        Discharge Medications        Continue These Medications        Instructions Start Date   atenolol 25 MG tablet  Commonly known as: TENORMIN   25 mg, Oral, Daily      BIOTIN PO   Oral      CHLORPHEN MALEATE PO   4 mg, Oral, Daily Warfarin      cholecalciferol 25 MCG (1000 UT) tablet  Commonly known as: VITAMIN D3   1,000 Units, Oral, Daily      multivitamin with minerals tablet tablet   Oral, Daily             Stop These Medications      valsartan-hydrochlorothiazide 160-25 MG per tablet  Commonly known as: DIOVAN-HCT              Allergies   Allergen Reactions    Ace Inhibitors Other (See Comments)     cough    Gabapentin     Levaquin [Levofloxacin]     Metronidazole     Tetracyclines & Related        Discharge Disposition:  Home or Self Care      Discharge Diet:  Diet Order   Procedures    Diet: Regular/House, Diabetic; Consistent Carbohydrate; Fluid Consistency: Thin (IDDSI 0)       Discharge Activity:   Activity Instructions       Activity as Tolerated              CODE STATUS:    Code Status and Medical Interventions: CPR (Attempt to Resuscitate); Full Support   Ordered at: 11/19/24 5174     Code Status (Patient has no pulse and is not breathing):    CPR (Attempt to Resuscitate)     Medical Interventions (Patient has " pulse or is breathing):    Full Support       No future appointments.   Follow-up Information       Ainsley Posadas APRN. Go on 12/6/2024.    Specialties: Nephrology, Nurse Practitioner  Why: Has follow-up with KERWIN Posadas December 6 at 9 AM.  Contact information:  [address]  [address]  [place] [id number] [phone]               Loly Juarez MD .    Specialty: Internal Medicine  Contact information:  [address]  [place] [id number] [phone]                             Time Spent on Discharge:  Greater than 30 minutes spent on discharge management including final examination, discussion of hospital stay and patient education, preparation of records, medication reconciliation, follow up planning      Chad Pearl MD  [facility]  11/20/24  13:07 EST

## 2024-11-20 NOTE — PROGRESS NOTES
[facility] Progress Note      Patient Name: Karey Tejada  : 1945  MRN: [id number]  Primary Care Physician:  Loly Juarez MD  Date of admission: 2024    Subjective     Interval History:   Follow up VICKEY on Ckd 3b.  Feels fine and back to normal.  Up to the bathroom earlier.  Not lightheaded or dizzy.  No nausea or diarrhea.  Eating.  Blood pressure yet to be taken this morning.  Had been improving.  Completed IV fluids this morning.    Review of Systems:   As noted above    Objective     Vitals:   Temp:  [97 °F (36.1 °C)-97.9 °F (36.6 °C)] 97.3 °F (36.3 °C)  Heart Rate:  [] 64  Resp:  [16-18] 16  BP: ()/(61-75) 121/69    Intake/Output Summary (Last 24 hours) at 2024 0958  Last data filed at 2024 0810  Gross per 24 hour   Intake 540 ml   Output 850 ml   Net -310 ml       Physical Exam:    General Appearance: alert, oriented x 3, no acute distress.  Impulsive.  Skin: warm and dry  HEENT: oral mucosa normal, nonicteric sclera  Neck: supple, no JVD  Lungs: Clear to auscultation.  Heart: RRR, normal S1 and S2  Abdomen: soft, nontender, nondistended. +bs  : no palpable bladder  Extremities: no edema.  Neuro: normal speech and mental status     Scheduled Meds:     cholecalciferol, 1,000 Units, Oral, Daily  multivitamin with minerals, 1 tablet, Oral, Daily  sodium chloride, 10 mL, Intravenous, Q12H      IV Meds:        Results Reviewed:   I have personally reviewed the results from the time of this admission to 2024 09:58 EST     Results from last 7 days   Lab Units 24  0343 24  1330   SODIUM mmol/L 143 135*   POTASSIUM mmol/L 3.8 3.7   CHLORIDE mmol/L 113* 100   CO2 mmol/L 19.6* 20.3*   BUN mg/dL 69* 92*   CREATININE mg/dL 1.39* 2.64*   CALCIUM mg/dL 8.5* 9.4   BILIRUBIN mg/dL  --  0.3   ALK PHOS U/L  --  66   ALT (SGPT) U/L  --  16   AST (SGOT) U/L  --  19   GLUCOSE mg/dL 94 191*       Estimated Creatinine Clearance: 31.9  mL/min (A) (by C-G formula based on SCr of 1.39 mg/dL (H)).    Results from last 7 days   Lab Units 11/20/24  0343   PHOSPHORUS mg/dL 2.5             Results from last 7 days   Lab Units 11/19/24  1330   WBC 10*3/mm3 13.30*   HEMOGLOBIN g/dL 13.8   PLATELETS 10*3/mm3 295             Assessment / Plan     ASSESSMENT:  Acute kidney injury on CKD 3B with baseline creatinine 1.4 to 1.6. Underlying hypertensive nephrosclerosis.  VICKEY due to   hypotension, hypovolemia, nausea with poor p.o. intake, diarrhea in the setting of impaired renal autoregulation with valsartan and hydrochlorothiazide.  Some contribution to azotemia from recent steroid.  Much improved with improvement in blood pressure and IV fluid volume repletion.  2.  Hypotension and presyncope.  History of hypertension on atenolol, losartan and hydrochlorothiazide.  Antihypertensives held and IV fluids given.  3.  Monoclonal gammopathy of unknown significance.  4.  Postmenopausal osteoporosis.  On Prolia.  5.  Vitamin D deficiency.  On supplement.  6.  Hyperglycemia likely due to recent steroid use. Improved.    PLAN:  Resume atenolol if systolic pressure over 120.  RN to check now.  2.  Would not resume valsartan/hydrochlorothiazide for now.  3.  If blood pressure stable early this afternoon I am okay with discharge.  4.  Has follow-up Dec. 6 at 9 AM.  With [facility]  Thank you for involving us in the care of Karey Tejada.  Please feel free to call with any questions.    Anne-Marie Hernandez MD  11/20/24  09:58 [facility]    [facility]  [phone]    Please note that portions of this note were completed with a voice recognition program.